# Patient Record
Sex: FEMALE | Race: WHITE | NOT HISPANIC OR LATINO | URBAN - NONMETROPOLITAN AREA
[De-identification: names, ages, dates, MRNs, and addresses within clinical notes are randomized per-mention and may not be internally consistent; named-entity substitution may affect disease eponyms.]

---

## 2020-06-29 ENCOUNTER — IMPORTED ENCOUNTER (OUTPATIENT)
Dept: URBAN - NONMETROPOLITAN AREA CLINIC 1 | Facility: CLINIC | Age: 35
End: 2020-06-29

## 2020-06-29 PROBLEM — H16.292: Noted: 2020-06-29

## 2020-06-29 PROCEDURE — 99203 OFFICE O/P NEW LOW 30 MIN: CPT

## 2020-06-29 NOTE — PATIENT DISCUSSION
Keratoconjunctivitis OS-  discussed findings w/patient-  d/c Vigamox-  start Tobradex QID OS x 7 days then f/u at home-  RTC at home or PRN

## 2022-04-09 ASSESSMENT — VISUAL ACUITY
OS_PH: 20/70
OS_SC: 20/80
OD_SC: 20/25